# Patient Record
Sex: FEMALE | Race: WHITE | NOT HISPANIC OR LATINO | Employment: FULL TIME | URBAN - METROPOLITAN AREA
[De-identification: names, ages, dates, MRNs, and addresses within clinical notes are randomized per-mention and may not be internally consistent; named-entity substitution may affect disease eponyms.]

---

## 2018-06-01 ENCOUNTER — OFFICE VISIT (OUTPATIENT)
Dept: ORTHOPEDIC SURGERY | Facility: CLINIC | Age: 58
End: 2018-06-01

## 2018-06-01 VITALS — HEIGHT: 62 IN | OXYGEN SATURATION: 99 % | HEART RATE: 71 BPM | WEIGHT: 127.87 LBS | BODY MASS INDEX: 23.53 KG/M2

## 2018-06-01 DIAGNOSIS — S93.492A SPRAIN OF ANTERIOR TALOFIBULAR LIGAMENT OF LEFT ANKLE, INITIAL ENCOUNTER: ICD-10-CM

## 2018-06-01 DIAGNOSIS — M79.672 LEFT FOOT PAIN: Primary | ICD-10-CM

## 2018-06-01 DIAGNOSIS — M72.2 PLANTAR FASCIITIS OF LEFT FOOT: ICD-10-CM

## 2018-06-01 PROCEDURE — 99204 OFFICE O/P NEW MOD 45 MIN: CPT | Performed by: ORTHOPAEDIC SURGERY

## 2018-06-01 NOTE — PROGRESS NOTES
[facility]    Subjective     Chief Complaint   Patient presents with   • Left Foot - Pain        HPI      Radha Acosta is a 58 y.o. female.  She complains of left foot and ankle pain.   She had an ankle sprain 4 weeks ago.  Her pain is 3 out of 10.  It is aching.  Now all of her pain is on the plantar aspect of her foot.  She's tried a brace and anti-inflammatories.  She was riding her horse and it was struck by a deer.  This caused her to fall off.        Past Medical History:   Diagnosis Date   • Cancer       Past Surgical History:   Procedure Laterality Date   • HYSTERECTOMY     • MASTECTOMY        Family History   Problem Relation Age of Onset   • Cancer Mother    • Diabetes Father    • Heart attack Father      Social History     Social History   • Marital status: Single     Spouse name: N/A   • Number of children: N/A   • Years of education: N/A     Occupational History   • Not on file.     Social History Main Topics   • Smoking status: Never Smoker   • Smokeless tobacco: Never Used   • Alcohol use No   • Drug use: No   • Sexual activity: Defer     Other Topics Concern   • Not on file     Social History Narrative   • No narrative on file      No current outpatient prescriptions on file prior to visit.     No current facility-administered medications on file prior to visit.       Allergies   Allergen Reactions   • Compazine  [Prochlorperazine Edisylate] Shortness Of Breath   • Metoclopramide Shortness Of Breath   • Ondansetron Shortness Of Breath   • Phenergan  [Promethazine Hcl] Shortness Of Breath   • Latex Rash   • Penicillins Rash   • Sulfa Antibiotics Rash        The following portions of the patient's history were reviewed and updated as appropriate: allergies, current medications, past family history, past medical history, past social history, past surgical history and problem list.    Review of Systems   Constitutional: Negative.    HENT: Negative.    Eyes: Negative.   "  Respiratory: Negative.    Cardiovascular: Negative.    Gastrointestinal: Negative.    Endocrine: Negative.    Genitourinary: Negative.    Musculoskeletal: Positive for joint swelling.   Skin: Negative.    Allergic/Immunologic: Negative.    Neurological: Negative.    Hematological: Negative.    Psychiatric/Behavioral: Negative.         Objective      Physical Exam  Pulse 71   Ht 157.5 cm (62\")   Wt 58 kg (127 lb 13.9 oz)   SpO2 99%   BMI 23.39 kg/m²     Body mass index is 23.39 kg/m².        GENERAL APPEARANCE: awake, alert & oriented x 3, in no acute distress and well developed, well nourished  PSYCH: normal mood and affect  LUNGS:  breathing nonlabored, no wheezing  EYES: sclera anicteric, pupils equal  CARDIOVASCULAR: palpable pulses dorsalis pedis, palpable posterior tibial bilaterally. Capillary refill less than 2 seconds  INTEGUMENTARY: skin intact, no clubbing, cyanosis  NEUROLOGIC:  Normal Sensation and reflexes             Ortho Exam  Peripheral Vascular:  Lower Extremity:  Inspection:  Left--rapid capillary refill  Palpation:  Dorsalis pedis pulse:  Left--normal    Neurologic  Sensory:    Light Touch:     Left foot:  Dorsal intact and plantar intact    Overall Assessment of Muscle Strength and Tone:  Lower Extremities:       Left:  Tibialis anterior--5/5    Gastroc soleus--5/5    EHL--5/5    FHL--5/5    Musculoskeletal  Lower Extremity  Ankle/Foot:  Inspection and Palpation:        Left:  Tenderness:none    Swelling:none    Effusion:  None    Crepitus:  None     ROM:     Left: Plantarflexion--50    Dorsiflexion--20    Inversion--10    Eversion--10    Instability:          Left: Anterior drawer test--negative    Squeeze test--negative    Imaging/Studies  Imaging Results (last 7 days)     Procedure Component Value Units Date/Time    XR Ankle 3+ View Left [[id number]] Resulted:  06/01/18 0858     Updated:  06/01/18 0858    Narrative:       Left Ankle X-Ray  Indication: Pain  Views: AP, " Mortise    Findings:   No fracture  No bony lesion  Soft tissues normal  Normal joint spaces with mortise well-aligned, no evidence of syndesmosis   widening    No prior studies available for comparison.      XR Foot 3+ View Left [[id number]] Resulted:  06/01/18 0857     Updated:  06/01/18 0857    Narrative:       Left Foot X-Ray  Indication: Pain  AP, Lateral, and Oblique views    Findings:  No fracture  No bony lesion  Normal soft tissues  Normal joint spaces    No prior studies were available for comparison.              Assessment/Plan        ICD-10-CM ICD-9-CM   1. Left foot pain M79.672 729.5   2. Sprain of anterior talofibular ligament of left ankle, initial encounter S93.492A 845.09   3. Plantar fasciitis of left foot M72.2 728.71       Orders Placed This Encounter   Procedures   • XR Foot 3+ View Left   • XR Ankle 3+ View Left    She will continue home stretching.  She'll follow-up when necessary she just wanted reassurance that this is what she had    Medical Decision Making  Management Options : over-the-counter medicine  Data/Risk: radiology tests and independent visualization of imaging, lab tests, or EMG/NCV    Virgilio Schultz MD  06/01/18  8:59 AM         EMR Dragon/Transcription disclaimer:  Much of this encounter note is an electronic transcription of spoken language to printed text. Electronic transcription of spoken language may permit erroneous, or at times, nonsensical words or phrases to be inadvertently transcribed. Although I have reviewed the note for such errors, some may still exist.

## 2024-04-10 NOTE — PROGRESS NOTES
"  Subjective     PROBLEM LIST:  History of bilateral breast cancer  First cancer diagnosed 1997  Her2+ breast cancer diagnosed in 2007.  Treated with bilateral mastectomy, 12 weeks of taxane, and 52 weeks of herceptin.  Hypothyroidism  Spinal schwannoma, followed by neurosurgery    CHIEF COMPLAINT: history of breast cancer      HISTORY OF PRESENT ILLNESS:  The patient is a 64 y.o. female, referred for a history of bilateral breast cancer.    She was diagnosed with cancer originally in 1997.  She had a lumpectomy and radiation and received chemotherapy which she remembers included methotrexate.  She tried tamoxifen but she was not able to tolerate this, and she subsequently had a total hysterectomy.  She was diagnosed with a second contralateral breast cancer in 2007.  This was HER2 positive.  She received Herceptin along with chemotherapy and has been in remission since that time.    She has a family history of breast cancer in her mother and her sister.  She and her sister both had genetic testing which was negative.    In her follow-up with Dr. Aceves she had a yearly chest x-ray.  Most recently this showed a mass near the spine and further imaging and workup has revealed this to be a schwannoma.  She has seen Dr. Muhammad and neurosurgery at [facility], and she has also gone to [facility] for second opinion.  She has an appointment with the thoracic surgeon at [facility] in early May.    REVIEW OF SYSTEMS:  A 14 point review of systems was performed and is negative except as noted above.    Past Medical History:   Diagnosis Date    Cancer            Objective     /81   Pulse 71   Temp 97.6 °F (36.4 °C) (Temporal)   Resp 16   Ht 157.5 cm (62\")   Wt 55.8 kg (123 lb)   SpO2 98%   BMI 22.50 kg/m²   Performance Status:  ECOG score: 0           General: well appearing female in no acute distress  Neuro: alert and oriented  HEENT: sclerae anicteric, oropharynx clear  Lymphatics: no cervical, " "supraclavicular, or axillary adenopathy  Cardiovascular: regular rate and rhythm, no murmurs  Chest: s/p bilateral mastectomy.  No palpable masses or lesions bilaterally  Lungs: clear to auscultation bilaterally  Abdomen: soft, nontender, nondistended.  No palpable organomegaly  Extremities: no lower extremity edema  Skin: no rashes, lesions, bruising, or petechiae  Psych: mood and affect appropriate      No results found for: \"GLUCOSE\", \"BUN\", \"CREATININE\", \"EGFRIFNONA\", \"EGFRIFAFRI\", \"BCR\", \"K\", \"CO2\", \"CALCIUM\", \"PROTENTOTREF\", \"ALBUMIN\", \"LABIL2\", \"BILIRUBIN\", \"AST\", \"ALT\"    XR Ankle 3+ View Left  Left Ankle X-Ray  Indication: Pain  Views: AP, Mortise    Findings:   No fracture  No bony lesion  Soft tissues normal  Normal joint spaces with mortise well-aligned, no evidence of syndesmosis   widening    No prior studies available for comparison.  XR Foot 3+ View Left  Left Foot X-Ray  Indication: Pain  AP, Lateral, and Oblique views    Findings:  No fracture  No bony lesion  Normal soft tissues  Normal joint spaces    No prior studies were available for comparison.            ASSESSMENT AND PLAN:     Radha Acosta is a 64 y.o. female with a history of bilateral breast cancer, now over 15 years out from her most recent diagnosis.  She is doing well with no evidence of cancer recurrence at this time.    From the standpoint of her breast cancer I will plan to see her once yearly.  As she has labs with other providers I will not order any additional blood work here but will review her other results.  I also would recommend no further chest x-ray imaging, but just imaging as needed.    Schwannoma: She is currently trying to make a decision regarding whether she needs surgery and if so where and how that surgery would occur.  I recommend that she follow-up at [facility] as scheduled with the thoracic surgeon.  As she is most likely asymptomatic from this it may be reasonable to just continue with monitoring in " the near term.    Follow-up in 1 year.           A total greater than 60 mins minutes was spent in face to face patient time, examination, counseling, charting, reviewing test results, and reviewing outside records.    Jina Perez MD    4/11/2024

## 2024-04-11 ENCOUNTER — CONSULT (OUTPATIENT)
Dept: ONCOLOGY | Facility: CLINIC | Age: 64
End: 2024-04-11
Payer: COMMERCIAL

## 2024-04-11 VITALS
HEIGHT: 62 IN | WEIGHT: 123 LBS | RESPIRATION RATE: 16 BRPM | SYSTOLIC BLOOD PRESSURE: 146 MMHG | HEART RATE: 71 BPM | DIASTOLIC BLOOD PRESSURE: 81 MMHG | OXYGEN SATURATION: 98 % | TEMPERATURE: 97.6 F | BODY MASS INDEX: 22.63 KG/M2

## 2024-04-11 DIAGNOSIS — Z85.3 HISTORY OF BREAST CANCER: Primary | ICD-10-CM

## 2024-04-11 RX ORDER — VITAMIN B COMPLEX
CAPSULE ORAL DAILY
COMMUNITY

## 2024-04-11 RX ORDER — LEVOTHYROXINE SODIUM 0.03 MG/1
TABLET ORAL
COMMUNITY
Start: 2024-04-05 | End: 2024-04-11

## 2024-04-11 NOTE — LETTER
April 11, 2024     Kathy Fitch MD  [address] [place] [address] [place] [id number]    Patient: Radha Acosta   YOB: 1960   Date of Visit: 4/11/2024       Dear Kathy Fitch MD    Radha Acosta was in my office today. Below is a copy of my note.    If you have questions, please do not hesitate to call me. I look forward to following Radha along with you.         Sincerely,        Jina Perez MD        CC: No Recipients      Subjective    PROBLEM LIST:  History of bilateral breast cancer  First cancer diagnosed 1997  Her2+ breast cancer diagnosed in 2007.  Treated with bilateral mastectomy, 12 weeks of taxane, and 52 weeks of herceptin.  Hypothyroidism  Spinal schwannoma, followed by neurosurgery    CHIEF COMPLAINT: history of breast cancer      HISTORY OF PRESENT ILLNESS:  The patient is a 64 y.o. female, referred for a history of bilateral breast cancer.    She was diagnosed with cancer originally in 1997.  She had a lumpectomy and radiation and received chemotherapy which she remembers included methotrexate.  She tried tamoxifen but she was not able to tolerate this, and she subsequently had a total hysterectomy.  She was diagnosed with a second contralateral breast cancer in 2007.  This was HER2 positive.  She received Herceptin along with chemotherapy and has been in remission since that time.    She has a family history of breast cancer in her mother and her sister.  She and her sister both had genetic testing which was negative.    In her follow-up with Dr. Aceves she had a yearly chest x-ray.  Most recently this showed a mass near the spine and further imaging and workup has revealed this to be a schwannoma.  She has seen Dr. Muhammad and neurosurgery at [facility], and she has also gone to [facility] for second opinion.  She has an appointment with the thoracic surgeon at [facility] in early May.    REVIEW OF SYSTEMS:  A 14 point review of systems was performed and is  "negative except as noted above.    Past Medical History:   Diagnosis Date   • Cancer            Objective    /81   Pulse 71   Temp 97.6 °F (36.4 °C) (Temporal)   Resp 16   Ht 157.5 cm (62\")   Wt 55.8 kg (123 lb)   SpO2 98%   BMI 22.50 kg/m²   Performance Status:  ECOG score: 0           General: well appearing female in no acute distress  Neuro: alert and oriented  HEENT: sclerae anicteric, oropharynx clear  Lymphatics: no cervical, supraclavicular, or axillary adenopathy  Cardiovascular: regular rate and rhythm, no murmurs  Lungs: clear to auscultation bilaterally  Abdomen: soft, nontender, nondistended.  No palpable organomegaly  Extremities: no lower extremity edema  Skin: no rashes, lesions, bruising, or petechiae  Psych: mood and affect appropriate      No results found for: \"GLUCOSE\", \"BUN\", \"CREATININE\", \"EGFRIFNONA\", \"EGFRIFAFRI\", \"BCR\", \"K\", \"CO2\", \"CALCIUM\", \"PROTENTOTREF\", \"ALBUMIN\", \"LABIL2\", \"BILIRUBIN\", \"AST\", \"ALT\"    XR Ankle 3+ View Left  Left Ankle X-Ray  Indication: Pain  Views: AP, Mortise    Findings:   No fracture  No bony lesion  Soft tissues normal  Normal joint spaces with mortise well-aligned, no evidence of syndesmosis   widening    No prior studies available for comparison.  XR Foot 3+ View Left  Left Foot X-Ray  Indication: Pain  AP, Lateral, and Oblique views    Findings:  No fracture  No bony lesion  Normal soft tissues  Normal joint spaces    No prior studies were available for comparison.            ASSESSMENT AND PLAN:     Radha Acosta is a 64 y.o. female with a history of bilateral breast cancer, now over 15 years out from her most recent diagnosis.  She is doing well with no evidence of cancer recurrence at this time.    From the standpoint of her breast cancer I will plan to see her once yearly.  As she has labs with other providers I will not order any additional blood work here but will review her other results.  I also would recommend no further chest x-ray " imaging, but just imaging as needed.    Schwannoma: She is currently trying to make a decision regarding whether she needs surgery and if so where and how that surgery would occur.  I recommend that she follow-up at [facility] as scheduled with the thoracic surgeon.  As she is most likely asymptomatic from this it may be reasonable to just continue with monitoring in the near term.    Follow-up in 1 year.           A total greater than 60 mins minutes was spent in face to face patient time, examination, counseling, charting, reviewing test results, and reviewing outside records.    Jina Perez MD    4/11/2024

## 2024-10-24 DIAGNOSIS — Z85.3 HISTORY OF BREAST CANCER: Primary | ICD-10-CM

## 2024-10-24 DIAGNOSIS — D36.10 SCHWANNOMA: ICD-10-CM

## 2024-11-22 ENCOUNTER — HOSPITAL ENCOUNTER (OUTPATIENT)
Dept: CT IMAGING | Facility: HOSPITAL | Age: 64
Discharge: HOME | End: 2024-11-22
Admitting: INTERNAL MEDICINE
Payer: COMMERCIAL

## 2024-11-22 DIAGNOSIS — Z85.3 HISTORY OF BREAST CANCER: ICD-10-CM

## 2024-11-22 DIAGNOSIS — D36.10 SCHWANNOMA: ICD-10-CM

## 2024-11-22 PROCEDURE — 71260 CT THORAX DX C+: CPT

## 2024-11-22 PROCEDURE — 25510000001 IOPAMIDOL 61 % SOLUTION: Performed by: INTERNAL MEDICINE

## 2024-11-22 RX ORDER — IOPAMIDOL 612 MG/ML
100 INJECTION, SOLUTION INTRAVASCULAR
Status: COMPLETED | OUTPATIENT
Start: 2024-11-22 | End: 2024-11-22

## 2024-11-22 RX ADMIN — IOPAMIDOL 85 ML: 612 INJECTION, SOLUTION INTRAVENOUS at 09:09

## 2024-11-25 ENCOUNTER — TELEPHONE (OUTPATIENT)
Dept: ONCOLOGY | Facility: CLINIC | Age: 64
End: 2024-11-25
Payer: COMMERCIAL

## 2024-11-25 NOTE — TELEPHONE ENCOUNTER
I called patient per Dr Perez to let her know CT chest looks good - this is baseline post surgery.  3 mm nodule which is not worrisome and does not require follow up.   Patient will look for the report later in mychart.

## 2024-12-10 ENCOUNTER — PATIENT MESSAGE (OUTPATIENT)
Dept: ONCOLOGY | Facility: CLINIC | Age: 64
End: 2024-12-10
Payer: COMMERCIAL

## 2025-04-23 ENCOUNTER — OFFICE VISIT (OUTPATIENT)
Dept: ONCOLOGY | Facility: CLINIC | Age: 65
End: 2025-04-23
Payer: COMMERCIAL

## 2025-04-23 VITALS
RESPIRATION RATE: 18 BRPM | BODY MASS INDEX: 22.26 KG/M2 | WEIGHT: 121 LBS | OXYGEN SATURATION: 100 % | DIASTOLIC BLOOD PRESSURE: 73 MMHG | TEMPERATURE: 98.7 F | HEIGHT: 62 IN | HEART RATE: 88 BPM | SYSTOLIC BLOOD PRESSURE: 119 MMHG

## 2025-04-23 DIAGNOSIS — R91.1 LUNG NODULE SEEN ON IMAGING STUDY: Primary | ICD-10-CM

## 2025-04-23 PROCEDURE — 99213 OFFICE O/P EST LOW 20 MIN: CPT | Performed by: INTERNAL MEDICINE
